# Patient Record
Sex: MALE | Race: WHITE | NOT HISPANIC OR LATINO | Employment: FULL TIME | URBAN - METROPOLITAN AREA
[De-identification: names, ages, dates, MRNs, and addresses within clinical notes are randomized per-mention and may not be internally consistent; named-entity substitution may affect disease eponyms.]

---

## 2017-09-13 ENCOUNTER — TRANSFERRED RECORDS (OUTPATIENT)
Dept: HEALTH INFORMATION MANAGEMENT | Facility: CLINIC | Age: 37
End: 2017-09-13

## 2017-10-26 ENCOUNTER — TRANSFERRED RECORDS (OUTPATIENT)
Dept: HEALTH INFORMATION MANAGEMENT | Facility: CLINIC | Age: 37
End: 2017-10-26

## 2017-12-07 ENCOUNTER — TRANSFERRED RECORDS (OUTPATIENT)
Dept: HEALTH INFORMATION MANAGEMENT | Facility: CLINIC | Age: 37
End: 2017-12-07

## 2018-04-13 ENCOUNTER — TRANSFERRED RECORDS (OUTPATIENT)
Dept: HEALTH INFORMATION MANAGEMENT | Facility: CLINIC | Age: 38
End: 2018-04-13

## 2018-07-31 ENCOUNTER — TRANSFERRED RECORDS (OUTPATIENT)
Dept: HEALTH INFORMATION MANAGEMENT | Facility: CLINIC | Age: 38
End: 2018-07-31

## 2019-04-08 ENCOUNTER — TRANSFERRED RECORDS (OUTPATIENT)
Dept: HEALTH INFORMATION MANAGEMENT | Facility: CLINIC | Age: 39
End: 2019-04-08

## 2020-04-06 ENCOUNTER — TRANSFERRED RECORDS (OUTPATIENT)
Dept: HEALTH INFORMATION MANAGEMENT | Facility: CLINIC | Age: 40
End: 2020-04-06

## 2020-07-30 ENCOUNTER — TRANSFERRED RECORDS (OUTPATIENT)
Dept: HEALTH INFORMATION MANAGEMENT | Facility: CLINIC | Age: 40
End: 2020-07-30

## 2020-08-27 ENCOUNTER — TRANSFERRED RECORDS (OUTPATIENT)
Dept: HEALTH INFORMATION MANAGEMENT | Facility: CLINIC | Age: 40
End: 2020-08-27

## 2022-03-23 ENCOUNTER — TELEPHONE (OUTPATIENT)
Dept: NEUROLOGY | Facility: CLINIC | Age: 42
End: 2022-03-23

## 2022-03-23 NOTE — TELEPHONE ENCOUNTER
[facility] Call Center    Phone Message    May a detailed message be left on voicemail: yes     Reason for Call: Other: Pt called stating that he is scheduled for an appointment on 08/09/22 with Dr. Osborne but he is not wanting to wait that long to be seen. He stated that he can see either Dr. Osborne or Dr. Perez but would like a call about any earlier appt. Writer did advise that Pt was already added to waitlist.      Action Taken: Message routed to:  [facility] ([facility]    Travel Screening: Not Applicable

## 2022-03-30 ENCOUNTER — MEDICAL CORRESPONDENCE (OUTPATIENT)
Dept: HEALTH INFORMATION MANAGEMENT | Facility: CLINIC | Age: 42
End: 2022-03-30
Payer: COMMERCIAL

## 2022-05-12 NOTE — TELEPHONE ENCOUNTER
Action 5/12/22 MV 10.47am   Action Taken Imaging request faxed to  + MASON Foley  [facility] fax #: [phone]    --5/21/22 MV 10.40am--   images resolved in PACS ; reports received from  but still need images    6/8/22 MV 10.54am  2nd request faxed to [facility]    6/10/22 MV 12.40pm  Images received from [facility] and sent to N for processing           RECORDS RECEIVED FROM: Self   REASON FOR VISIT: Radiographic Isolated Syndrome   Date of Appt: 8/9/22   NOTES (FOR ALL VISITS) STATUS DETAILS   OFFICE NOTE from other specialist Care Everywhere Dr Bala Park @ [facility]:  3/2/22    Dr Concetta Blanco @  Neurology:  7/6/21    Dr Mario Weiner @ [facility]:  11/28/17  11/2/17  10/11/17  9/5/17   MEDICATION LIST Care Everywhere    IMAGING  (FOR ALL VISITS)     LUMBAR PUNCTURE Care Everywhere [facility]:  11/20/17   MRI (HEAD, NECK, SPINE) Received [facility]:  MRI Thoracic Spine 11/24/21  MRI Cervical Spine 11/24/21  MRI Brain 11/24/21  MRI Cervical Spine 5/17/21  MRI Brain 5/17/21    [facility] [place]:  MRI Cervical Spine 4/8/19  MRI Brain 4/8/19  MRI Cervical Spine 7/31/18  MRI Brain 4/13/18  MRV Brain 12/7/17  MRI Brain 10/26/17  MRI Brain 9/13/17

## 2022-07-20 NOTE — TELEPHONE ENCOUNTER
Patient called into the call center as well as he sent a [facility] message. I have reach out both way and now will continue to communicate through [facility]    Will close this encounter    Dipti Arguello MA

## 2022-07-20 NOTE — TELEPHONE ENCOUNTER
[facility] Call Center    Phone Message    May a detailed message be left on voicemail: yes     Reason for Call: Other: Patient has an appt on 8/9/22 with Dr. Osborne and he states [place] is going [place] during that time. Writer tried to reschedule appt for sooner appt and appt not available. Patient would like be advised on what next steps to take for his care.      Please contact patient to advise at [phone].    Action Taken: Message routed to:  [facility] Neurology    Travel Screening: Not Applicable

## 2022-07-24 ENCOUNTER — HEALTH MAINTENANCE LETTER (OUTPATIENT)
Age: 42
End: 2022-07-24

## 2022-08-09 ENCOUNTER — PRE VISIT (OUTPATIENT)
Dept: NEUROLOGY | Facility: CLINIC | Age: 42
End: 2022-08-09
Payer: COMMERCIAL

## 2022-10-03 ENCOUNTER — HEALTH MAINTENANCE LETTER (OUTPATIENT)
Age: 42
End: 2022-10-03

## 2022-10-18 ENCOUNTER — OFFICE VISIT (OUTPATIENT)
Dept: NEUROLOGY | Facility: CLINIC | Age: 42
End: 2022-10-18
Attending: PSYCHIATRY & NEUROLOGY
Payer: COMMERCIAL

## 2022-10-18 VITALS
OXYGEN SATURATION: 94 % | HEART RATE: 81 BPM | SYSTOLIC BLOOD PRESSURE: 115 MMHG | WEIGHT: 169.4 LBS | DIASTOLIC BLOOD PRESSURE: 78 MMHG

## 2022-10-18 DIAGNOSIS — G35 MS (MULTIPLE SCLEROSIS) (H): Primary | ICD-10-CM

## 2022-10-18 PROCEDURE — G0463 HOSPITAL OUTPT CLINIC VISIT: HCPCS

## 2022-10-18 PROCEDURE — 99204 OFFICE O/P NEW MOD 45 MIN: CPT | Mod: GC | Performed by: PSYCHIATRY & NEUROLOGY

## 2022-10-18 RX ORDER — ERGOCALCIFEROL 1.25 MG/1
50000 CAPSULE, LIQUID FILLED ORAL WEEKLY
COMMUNITY

## 2022-10-18 ASSESSMENT — PAIN SCALES - GENERAL: PAINLEVEL: MILD PAIN (2)

## 2022-10-18 NOTE — PROGRESS NOTES
Neurology Clinic Visit    Reason: Evaluation for possible Multiple Sclerosis       10/18/2022   Source of information: Patient and chart review    History of Present Symptom:  Ramakrishna Mi is a 42 year old male with a PMH significant for right retinal detachment, traumatic cataract of the right eye, dystonic tremor who presents for concern for multiple sclerosis.    In early 2014 he developed a head tremor which led to MRI brain and cervical spine. This revealed 1-2 corpus callosum T2 lesions although it was read as normal. In 2017 he was seen for tremor again and MRI was repeated. This demonstrated T2 lesions again and this time he was recommended to undergo LP and repeat MRI. CSF studies were with positive OCB. Remained off of therapy because he lacked clinical symptoms consistent with MS.     We discussed 5-6 years ago right hand and right leg become weak. First started in the hand which slowly spread to the foot and increased over time. In the past few months he has started feeling this is worsening, limiting his ability to go about his normal life. He is having mild difficulty driving. He has started fatiguing more during exercise and will have to stop and rest due to the weakness. He feels he could walk 2-3 miles at lest before the right leg would start to feel week. He does also get tightness and pain in the foot on the right side. Additionally he notes that he has had more prominent urinary frequency over the past few months. He feels his energy level is good and he has not noticed balance difficulty.      He was last seen by [facility] 3/2/22 where he was diagnosed with radiologically isolated syndrome. His imaging remained stable from 6/2014 through 11/2021. He did discuss DMT with consideration that his weakness could be related to an old left midbrain lesion. He opted to continue observation and hold off on DMT at that time.     He has a maternal first cousin with MS.    A No-No tremor developed  about 7-8 years ago, this has improved resolved slowly over time. He did get botox for this in the past. Stress worsened tremor. He has a family history of similar tremor in his father and paternal uncle. He has also tried beta blocker, primidone, and gabapentin in the past.     The patient's medical, surgical, social, and family history were personally reviewed with the patient.    Soc hx:  Denies alcohol or substance use. Denies tobacco use.   Works as an  at the [employer].     No past medical history on file.     No past surgical history on file.   -right cataract surgery many years ago  -right vitrectomy 2021     No family history on file.  Current Outpatient Medications   Medication     vitamin D2 (ERGOCALCIFEROL) 69229 units (1250 mcg) capsule     No current facility-administered medications for this visit.     No Known Allergies      Review of Systems:  14-point review of systems was completed. The pertinent positives and negatives are in the HPI.    Physical Examination   Vitals: /78 (BP Location: Right arm, Patient Position: Sitting, Cuff Size: Adult Regular)   Pulse 81   Wt 76.8 kg (169 lb 6.4 oz)   SpO2 94%   General: Patient appears comfortable in no acute distress.   HEENT: NC/AT, no icterus, moist mucous membranes  Chest: non-labored on RA  Extremities: Warm, no edema  Skin: No rash or lesion   Psych: Affect appropriate for situation   Neuro:  Mental status: Awake, alert, attentive. Language is fluent with intact comprehension of commands.  Cranial nerves:Right pupil is 4 mm and reactive, it is superiorly placed within the iris, conjugate gaze, EOMI, visual fields intact, face symmetric, shoulder shrug strong, tongue protrusion/uvula midline, no dysarthria.   Motor: Mildly increased tone right ankle. Right to left rotational head tremor.     R L  Deltoid  5 5  Biceps  5 5  Triceps 5 5  Wrist ext 5 5  Finger ext 5 5  Finger abd 5 5    Hip flexion 5- 5  Knee flexion 5 5  Knee  ext 5 5  Dorsiflexion 5 5    Reflexes: 2+ reflexes symmetric in biceps, brachioradialis, brisk r>l patellae with crossed adduction bl, and achilles. A few beats clonus on the right, toes down-going.  Sensory: Intact to light touch, mildly reduced vibration right toe. Romberg is negative.   Coordination: FNF without ataxia or dysmetria. Coordination to finger tapping and toe tapping is symmetric.    Gait: Normal width, stride length, turn, with symmetric arm swing. Tandem walk intact.    Laboratory:  Vit D 81     CSF (2017) 4 OCB, elevated IgG  3 wbc, normal glucose and protein     Imaging:    MRI brain 11/2021  MRI brain multiple ovoid demyelinating lesions in the brain and radially oriented relative to the ventricles. Small midbrain lesion. Stable as compared to 2017. Non-contrast.     MRI c-spine   One small left dorsal cord lesion at C4-C5. Non-contrast.    MRI t-spine normal. Non-contrast.     Assessment/Plan:  Ramakrishna Mi is a 42 year old male who presents for evaluation for multiple sclerosis. We reviewed his MRI brain and spine and the imaging fits with a diagnosis of multiple sclerosis. His progressive right sided weakness which came on insidiously and worsens with exercise sounds typical of a progressive MS course. He does have a very small left sided midbrain lesion which could potentially cause his symptoms. We discussed the diagnosis of progressive MS and that he meets criteria for this. We discussed that considering his symptoms came on over the course of many years and his imaging has remained fairly stable from 2014 through 2021 aside from one left sided spinal cord lesion likely supports a slower or less aggressive disease course.     We discussed that treatments for progressive MS are limited. We discussed the FDA approved option would be Ocrevus. Discussed risks and benefits including risk of infusion reactions and viral infections. We discussed also options that work similarly including  Kesimpta and rituximab. He asks also about stem cell transplant. We discussed that there is significant risk of health complications with this and there has been shown to be poor results in progressive MS.     We discussed that it would be very reasonable to wait and watch how his imaging and exam change over time considering how minimal his day to day symptoms are at this time and how little his MRI has changed. His exam today is with minimal right thigh flexion weakness, mild sensory loss in the right toe, and brisk reflexes. He would prefer this for now. Would have a low threshold to start ocrevus if any new lesions or if he notices any progression of symptoms.     -continue vit D  -consider Ocrevus   -plan for follow up in 6 months, let us know if any new symptoms occur in the mean time.     Patient seen and discussed with Dr. Osborne.  I have reviewed the plan with the patient, who is in agreement.      Urszula Florentino, DO  Multiple Sclerosis Fellow      I saw and examined this patient, and have read and edited the resident's note.  I agree with the findings, assessment, and plan.  I spent 51 minutes on his care on the date of service, including chart review and face to face time.  Ramakrishna has MRI and CSF findings typical of MS, with complaints of insidious onset of fatiguable R arm and leg, but essentially normal neurologic exam except for assymetric LE reflexes.  I told him I think he does meet criteria for primary progressive MS.  Discussed ocrelizuamb as an option for that.  His symptoms are so mild that it is also reasonable to proceed with watchful waiting.  Clinical trial of masitinib or placebo may also be a possibility.  Plan as above.    Osito Osborne MD

## 2022-10-24 PROBLEM — G35 MS (MULTIPLE SCLEROSIS) (H): Status: ACTIVE | Noted: 2022-10-24

## 2023-04-04 ENCOUNTER — OFFICE VISIT (OUTPATIENT)
Dept: NEUROLOGY | Facility: CLINIC | Age: 43
End: 2023-04-04
Attending: PSYCHIATRY & NEUROLOGY
Payer: COMMERCIAL

## 2023-04-04 VITALS — SYSTOLIC BLOOD PRESSURE: 113 MMHG | DIASTOLIC BLOOD PRESSURE: 70 MMHG | OXYGEN SATURATION: 97 % | HEART RATE: 76 BPM

## 2023-04-04 DIAGNOSIS — R25.1 TREMOR: ICD-10-CM

## 2023-04-04 DIAGNOSIS — G35 MS (MULTIPLE SCLEROSIS) (H): Primary | ICD-10-CM

## 2023-04-04 PROCEDURE — G0463 HOSPITAL OUTPT CLINIC VISIT: HCPCS | Performed by: PSYCHIATRY & NEUROLOGY

## 2023-04-04 PROCEDURE — 99215 OFFICE O/P EST HI 40 MIN: CPT | Performed by: PSYCHIATRY & NEUROLOGY

## 2023-04-04 ASSESSMENT — PAIN SCALES - GENERAL: PAINLEVEL: NO PAIN (0)

## 2023-04-04 NOTE — NURSING NOTE
Chief Complaint   Patient presents with     MS     RECHECK     6 month follow up      Vitals were taken and medications were reconciled.   Kieran Griffin, EMT  10:25 AM

## 2023-04-04 NOTE — Clinical Note
4/4/2023       RE: Ramakrishna Mi  [address]  [address]  [place] [address]     Dear Colleague,    Thank you for referring your patient, Ramakrishna Mi, to the [facility]. Please see a copy of my visit note below.    No notes on file    Again, thank you for allowing me to participate in the care of your patient.      Sincerely,    Osito Osborne MD

## 2023-04-04 NOTE — PROGRESS NOTES
"Service Date: 04/04/2023    REASON FOR VISIT:  Ramakrishna Mi is a 42-year-old man who I have seen once previously for tremor and probable multiple sclerosis.  He returns for routine followup.  He arrived about 15 minutes late today.    HISTORY OF PRESENT ILLNESS:  Ramakrishna has been neurologically stable, thinks some things may be a bit improved including having less fatigue.  His history is of a head tremor which came on in 2014.  This led to him having workup including MRI, which showed a few lesions consistent with demyelinating disease, and ultimately CSF studies, which showed intrathecal IgG synthesis.  He then developed some subtle and insidious clumsiness and fatigability in the right arm and leg.  His walking remains fine and he thinks an impartial observer would not notice anything wrong with that.  He previously was treated with botulinum toxin injections for the tremor, but the tremor has improved over time and is really quite subtle.  It is a side to side, \"no, no\" tremor that is worse when he is stressed and tends to be worse when he has the head turned to the left.  He has some mild bladder urgency, but not clearly over and above normal range.  He has poor vision in his right eye due to prior retinal detachment and traumatic cataract of that eye.  He tends to do a lot of research into MS and has contacted us with many questions.  He has recently changed his diet, cut out gluten, and that is going fine.    PHYSICAL EXAMINATION:  Blood pressure 116/71, pulse 77, respiratory rate 12.  He is alert and oriented.  Affect is bright and language functions are normal.  Visual acuity is 20/100 in the right eye and 20/20 in the left eye.  The right pupil is superiorly displaced within the iris.  Eye movements are full without diplopia or nystagmus.  There is trace head tremor, barely noticeable during this visit.  Facial strength and sensation are normal.  Muscle bulk, tone, strength and dexterity are normal in " the arms and legs.  Light touch is intact in all 4 limbs.  Finger tapping, toe tapping and finger-nose-finger are normal.  Deep tendon reflexes are normal and symmetric at the elbows and knees.  Gait is narrow-based and stable with normal tandem walk and negative Romberg.    IMPRESSION:  Probable multiple sclerosis, phenotype unclear, but having taken an extremely benign course so far.    I spent 48 minutes on his care today, including chart review, face-to-face and documentation time.  He had lots of questions, as usual, including about the different MS phenotypes, diet in MS, and a recent study suggesting that cerebrospinal fluid from primary progressive MS causes MS like symptoms in mice.  I reassured him that I see no signs of ongoing MS progression and I would not recommend MS immunotherapy at this time.  We discussed MRI surveillance as well.    PLAN:  Follow up in 1 year with MRI of the brain.    Osito Osborne MD        D: 2023   T: 2023   MT: veronica    Name:     JOE REILLY  MRN:      [id number]        Account:      [id number]   :      1980           Service Date: 2023       Document: [id number]

## 2023-04-10 PROBLEM — R25.1 TREMOR: Status: ACTIVE | Noted: 2023-04-10

## 2023-08-13 ENCOUNTER — HEALTH MAINTENANCE LETTER (OUTPATIENT)
Age: 43
End: 2023-08-13

## 2024-04-02 ENCOUNTER — OFFICE VISIT (OUTPATIENT)
Dept: NEUROLOGY | Facility: CLINIC | Age: 44
End: 2024-04-02
Attending: PSYCHIATRY & NEUROLOGY
Payer: COMMERCIAL

## 2024-04-02 ENCOUNTER — ANCILLARY PROCEDURE (OUTPATIENT)
Dept: MRI IMAGING | Facility: CLINIC | Age: 44
End: 2024-04-02
Attending: PSYCHIATRY & NEUROLOGY
Payer: COMMERCIAL

## 2024-04-02 VITALS
OXYGEN SATURATION: 97 % | SYSTOLIC BLOOD PRESSURE: 111 MMHG | HEART RATE: 74 BPM | DIASTOLIC BLOOD PRESSURE: 73 MMHG | WEIGHT: 147.8 LBS

## 2024-04-02 DIAGNOSIS — G35 MS (MULTIPLE SCLEROSIS) (H): ICD-10-CM

## 2024-04-02 DIAGNOSIS — G25.0 BENIGN HEAD TREMOR: Primary | ICD-10-CM

## 2024-04-02 DIAGNOSIS — G37.9 DEMYELINATING DISEASE OF CENTRAL NERVOUS SYSTEM (H): ICD-10-CM

## 2024-04-02 PROCEDURE — 99214 OFFICE O/P EST MOD 30 MIN: CPT | Performed by: PSYCHIATRY & NEUROLOGY

## 2024-04-02 PROCEDURE — 70551 MRI BRAIN STEM W/O DYE: CPT | Performed by: RADIOLOGY

## 2024-04-02 ASSESSMENT — PAIN SCALES - GENERAL: PAINLEVEL: NO PAIN (0)

## 2024-04-02 NOTE — NURSING NOTE
Chief Complaint   Patient presents with    MS    RECHECK     MS follow up      Vitals were taken and medications were reconciled.  Kieran Griffin, EMT  10:04 AM

## 2024-04-02 NOTE — LETTER
"4/2/2024       RE: Ramakrishna Mi  [address] [place] [address]     Dear Colleague,    Thank you for referring your patient, Ramakrishna Mi, to the [facility] CLINIC [place] [facility]. Please see a copy of my visit note below.    ID:  Ramakrishna Mi is a 43-year-old man who I follow for probable multiple sclerosis.  He returns for annual follow-up and MRI review.    HPI:  Ramakrishna has been essentially stable neurologically, but he wonders if his memory is getting (subtly) worse, and his R hand slightly weaker.  He feels that his head tremor has been a bit more pronounced recently.  It is exacerbated by turning his head to the left, and seems to come on when he is at rest as opposed to when he is talking or \"doing something\".  He is learned to use this phenomenon to minimize the tremor during social interactions.  The head tremor arose about 10 years ago, and is what led to workup including brain MRI which showed lesions consistent with multiple sclerosis.  He also has a spinal cord lesion at C5 again typical of MS, and intrathecal IgG synthesis.  He never had anything similar in a clinical MS relapse.  He has reported subtle clumsiness and fatigability of the right arm and leg, although he has essentially had a normal neurologic exam.  He continues to work as an  at , and that is going fine.  His gait remains normal to an impartial observer.    No past medical history on file.   RIS/probable MS      Current Outpatient Medications:     vitamin D2 (ERGOCALCIFEROL) 60241 units (1250 mcg) capsule, Take 50,000 Units by mouth once a week, Disp: , Rfl:     Exam: He is alert and oriented.  Affect is bright and lung functions are normal.  He has mild head tremor brought on by turning the head to the left.  Cranial nerves unremarkable.  Muscle bulk and strength dexterity are normal in the arms and legs.  Finger tapping " toe tapping and finger-nose-finger are normal.  Light touch is intact in all 4 limbs.  Reflexes are normal and symmetric at the elbows and knees.  Gait is narrow-based and stable with normal tandem walk and negative Romberg.    We reviewed together his MRI of the brain done earlier today and compared it to the prior evaluation from 2021.  Again seen are a mild to moderate burden of typical white matter lesions including radially oriented periventricular lesions, juxtacortical lesions, and lesions in the cerebellar white matter.  There were no new lesions.  No contrast was given.    Impression: Radiological isolated syndrome, probable multiple sclerosis, stable radiologically but with subjective subtle changes in cognition and right motor function, on no immunotherapy.  I spent 40 minutes on his care on date of service including chart review and face-to-face time.  He has concerns that he has primary progressive MS, and that is possible given his insidious right motor symptoms, but his course has been remarkably benign and his neurologic exam remains normal except for the head tremor, which I do not think is a manifestation of multiple sclerosis (his father and a paternal uncle both have a similar head tremor).  I recommended he remain off MS immunotherapy and will follow-up with clinical and MRI surveillance.  He has had Botox for the head tremor in the past with some success, and he asked for a referral to try that again.    Plan: PMR referral for Botox for head tremor.  Follow-up in 1 year.  Repeat brain and cervical MRI in 2 years.    This note was completed in part using voice-recognition software, and some typographic errors may be present as a result.        Again, thank you for allowing me to participate in the care of your patient.      Sincerely,    Osito Osborne MD

## 2024-04-05 NOTE — PROGRESS NOTES
"ID:  Ramakrishna Mi is a 43-year-old man who I follow for probable multiple sclerosis.  He returns for annual follow-up and MRI review.    HPI:  Ramakrishna has been essentially stable neurologically, but he wonders if his memory is getting (subtly) worse, and his R hand slightly weaker.  He feels that his head tremor has been a bit more pronounced recently.  It is exacerbated by turning his head to the left, and seems to come on when he is at rest as opposed to when he is talking or \"doing something\".  He is learned to use this phenomenon to minimize the tremor during social interactions.  The head tremor arose about 10 years ago, and is what led to workup including brain MRI which showed lesions consistent with multiple sclerosis.  He also has a spinal cord lesion at C5 again typical of MS, and intrathecal IgG synthesis.  He never had anything similar in a clinical MS relapse.  He has reported subtle clumsiness and fatigability of the right arm and leg, although he has essentially had a normal neurologic exam.  He continues to work as an  at [employer], and that is going fine.  His gait remains normal to an impartial observer.    No past medical history on file.   RIS/probable MS      Current Outpatient Medications:      vitamin D2 (ERGOCALCIFEROL) 95459 units (1250 mcg) capsule, Take 50,000 Units by mouth once a week, Disp: , Rfl:     Exam: He is alert and oriented.  Affect is bright and lung functions are normal.  He has mild head tremor brought on by turning the head to the left.  Cranial nerves unremarkable.  Muscle bulk and strength dexterity are normal in the arms and legs.  Finger tapping toe tapping and finger-nose-finger are normal.  Light touch is intact in all 4 limbs.  Reflexes are normal and symmetric at the elbows and knees.  Gait is narrow-based and stable with normal tandem walk and negative Romberg.    We reviewed together his MRI of the brain done earlier today and compared it to the prior " evaluation from 2021.  Again seen are a mild to moderate burden of typical white matter lesions including radially oriented periventricular lesions, juxtacortical lesions, and lesions in the cerebellar white matter.  There were no new lesions.  No contrast was given.    Impression: Radiological isolated syndrome, probable multiple sclerosis, stable radiologically but with subjective subtle changes in cognition and right motor function, on no immunotherapy.  I spent 40 minutes on his care on date of service including chart review and face-to-face time.  He has concerns that he has primary progressive MS, and that is possible given his insidious right motor symptoms, but his course has been remarkably benign and his neurologic exam remains normal except for the head tremor, which I do not think is a manifestation of multiple sclerosis (his father and a paternal uncle both have a similar head tremor).  I recommended he remain off MS immunotherapy and will follow-up with clinical and MRI surveillance.  He has had Botox for the head tremor in the past with some success, and he asked for a referral to try that again.    Plan: PMR referral for Botox for head tremor.  Follow-up in 1 year.  Repeat brain and cervical MRI in 2 years.    This note was completed in part using voice-recognition software, and some typographic errors may be present as a result.

## 2024-05-20 ENCOUNTER — TELEPHONE (OUTPATIENT)
Dept: PHYSICAL MEDICINE AND REHAB | Facility: CLINIC | Age: 44
End: 2024-05-20
Payer: COMMERCIAL

## 2024-05-20 NOTE — TELEPHONE ENCOUNTER
Left appointment reminder for patient. Instructed to call [phone] if this appointment needs to be changed or rescheduled BH

## 2024-05-21 ENCOUNTER — OFFICE VISIT (OUTPATIENT)
Dept: PHYSICAL MEDICINE AND REHAB | Facility: CLINIC | Age: 44
End: 2024-05-21
Payer: COMMERCIAL

## 2024-05-21 VITALS — OXYGEN SATURATION: 98 % | SYSTOLIC BLOOD PRESSURE: 116 MMHG | DIASTOLIC BLOOD PRESSURE: 76 MMHG | HEART RATE: 71 BPM

## 2024-05-21 DIAGNOSIS — G35 MS (MULTIPLE SCLEROSIS) (H): Primary | ICD-10-CM

## 2024-05-21 DIAGNOSIS — G25.0 BENIGN HEAD TREMOR: ICD-10-CM

## 2024-05-21 PROCEDURE — 99204 OFFICE O/P NEW MOD 45 MIN: CPT | Performed by: PHYSICAL MEDICINE & REHABILITATION

## 2024-05-21 RX ORDER — VENLAFAXINE HYDROCHLORIDE 75 MG/1
1 CAPSULE, EXTENDED RELEASE ORAL DAILY
COMMUNITY
Start: 2024-01-03

## 2024-05-21 NOTE — PROGRESS NOTES
CC: I am seeing this patient at the request of Dr. Osborne for treatment of head tremors with Botox.    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  He feels that when he is anxious his head tremor gets worse.  It has been going on since 2014.  He had workup which revealed few lesions and CSF study showed intrathecal IgG synthesis.  He would experience slight weakness in the right side of the body.  His tremor was treated with neurotoxin.  Last time he received it was in 2020.  He feels it has come back and it is a little worse now.  It is a side-to-side tremor and bothers him when he is not paying attention.  He denies any issues with sensory disturbance.  He does have a history of cataract in his right eye and subsequently had a retinal detachment.  He has had treatment done for that.    He currently works for [employer].  He is reporting no issues with swallowing.  He denies any issues with his bowel and bladder function.  He is unsure if some of this is due to his natural aging.  He finds the tremor to be a huge inconvenience and tries to minimize it without success.  He also gets neck pain with changing positions.  He finds the neck to be tight and tense at times.    Past medical history is notable for childhood trauma in the right eye.    Family history is notable for tremor in his dad and uncle.  One of his knees has MS.    Social history is notable for being  with children.    Review of systems is notable for problems notable otherwise negative.    Current Outpatient Medications   Medication Sig Dispense Refill    venlafaxine (EFFEXOR XR) 75 MG 24 hr capsule Take 1 capsule by mouth daily      vitamin D2 (ERGOCALCIFEROL) 46904 units (1250 mcg) capsule Take 50,000 Units by mouth once a week        /76   Pulse 71   SpO2 98%     On examination, patient is alert and cooperative.  Vitals are stable.  He is afebrile.    HEENT is  unremarkable.  Extraocular movements are intact.  Face is symmetric.  Head tremor with intubation is present.  With eyes closed it becomes more prominent.  It appears to involve muscles in the neck and shoulder bilaterally.  He is also mildly tender over the cervical facet region.  Muscles are also slightly taut and tense and there is some tenderness in them.    Neurological examination reveals good strength on both sides though subjectively he feels weaker on the right side.  Sensory examination was unremarkable.  Reflexes are symmetric and plantars were downgoing.  Straight leg raising test was negative.  Dejon was negative.    Romberg was negative.  Gait was unremarkable.  He was able to walk on his heels and toes and with a heel-to-toe progression.  Coordination tests were intact.    Impression: At this point this 43-year-old male with probable MS, head tremors, with some headache/neck and shoulder pain likely from tense muscles in the neck and shoulder region.  He has had Botox previously up to 185 units.  I believe he will benefit from repeat injections with about 200 units.  Will do this with EMG guidance based on activation.  Anticipate that it may take 1-3 rounds to optimize the dose and the sites.  We talked about side effects including the risk of dysphagia, neck weakness etc.  We also talked about his neck pain could potentially be coming from his cervical facets and may need separate treatment on their own.    He was appreciative of this information and would like us to proceed with neurotoxin approval and schedule for return visit for injections.    45 minutes visit, greater than 50% was for counseling on above-mentioned issues.    Jair lE MD

## 2024-05-21 NOTE — LETTER
5/21/2024       RE: Ramakrishna Mi  [address] [place] [address]       Dear Colleague,    Thank you for referring your patient, Ramakrishna Mi, to the [facility] at [facility]. Please see a copy of my visit note below.    CC: I am seeing this patient at the request of Dr. Osborne for treatment of head tremors with Botox.    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  He feels that when he is anxious his head tremor gets worse.  It has been going on since 2014.  He had workup which revealed few lesions and CSF study showed intrathecal IgG synthesis.  He would experience slight weakness in the right side of the body.  His tremor was treated with neurotoxin.  Last time he received it was in 2020.  He feels it has come back and it is a little worse now.  It is a side-to-side tremor and bothers him when he is not paying attention.  He denies any issues with sensory disturbance.  He does have a history of cataract in his right eye and subsequently had a retinal detachment.  He has had treatment done for that.    He currently works for [employer].  He is reporting no issues with swallowing.  He denies any issues with his bowel and bladder function.  He is unsure if some of this is due to his natural aging.  He finds the tremor to be a huge inconvenience and tries to minimize it without success.  He also gets neck pain with changing positions.  He finds the neck to be tight and tense at times.    Past medical history is notable for childhood trauma in the right eye.    Family history is notable for tremor in his dad and uncle.  One of his knees has MS.    Social history is notable for being  with children.    Review of systems is notable for problems notable otherwise negative.    Current Outpatient Medications   Medication Sig Dispense Refill    venlafaxine (EFFEXOR XR)  75 MG 24 hr capsule Take 1 capsule by mouth daily      vitamin D2 (ERGOCALCIFEROL) 11707 units (1250 mcg) capsule Take 50,000 Units by mouth once a week        /76   Pulse 71   SpO2 98%     On examination, patient is alert and cooperative.  Vitals are stable.  He is afebrile.    HEENT is unremarkable.  Extraocular movements are intact.  Face is symmetric.  Head tremor with intubation is present.  With eyes closed it becomes more prominent.  It appears to involve muscles in the neck and shoulder bilaterally.  He is also mildly tender over the cervical facet region.  Muscles are also slightly taut and tense and there is some tenderness in them.    Neurological examination reveals good strength on both sides though subjectively he feels weaker on the right side.  Sensory examination was unremarkable.  Reflexes are symmetric and plantars were downgoing.  Straight leg raising test was negative.  Dejon was negative.    Romberg was negative.  Gait was unremarkable.  He was able to walk on his heels and toes and with a heel-to-toe progression.  Coordination tests were intact.    Impression: At this point this 43-year-old male with probable MS, head tremors, with some headache/neck and shoulder pain likely from tense muscles in the neck and shoulder region.  He has had Botox previously up to 185 units.  I believe he will benefit from repeat injections with about 200 units.  Will do this with EMG guidance based on activation.  Anticipate that it may take 1-3 rounds to optimize the dose and the sites.  We talked about side effects including the risk of dysphagia, neck weakness etc.  We also talked about his neck pain could potentially be coming from his cervical facets and may need separate treatment on their own.    He was appreciative of this information and would like us to proceed with neurotoxin approval and schedule for return visit for injections.    45 minutes visit, greater than 50% was for counseling on  above-mentioned issues.          Again, thank you for allowing me to participate in the care of your patient.      Sincerely,    Jair El MD

## 2024-05-21 NOTE — NURSING NOTE
Ramakrishna Mi is a 43 year old male who presents for:  Chief Complaint   Patient presents with    Consult     Head tremor for a long time and using botox to treat but stopped upon moving here from [place] about 3.5 years ago. Would like to restart botox        Initial Vitals:  /76   Pulse 71   SpO2 98%  There is no height or weight on file to calculate BMI.. There is no height or weight on file to calculate BSA. BP completed using cuff size: regular  Data Unavailable    Nursing Comments:     Eddie Fabian

## 2024-06-21 ENCOUNTER — OFFICE VISIT (OUTPATIENT)
Dept: PHYSICAL MEDICINE AND REHAB | Facility: CLINIC | Age: 44
End: 2024-06-21
Payer: COMMERCIAL

## 2024-06-21 VITALS
HEART RATE: 67 BPM | WEIGHT: 147 LBS | DIASTOLIC BLOOD PRESSURE: 73 MMHG | OXYGEN SATURATION: 99 % | BODY MASS INDEX: 21.77 KG/M2 | SYSTOLIC BLOOD PRESSURE: 112 MMHG | HEIGHT: 69 IN

## 2024-06-21 DIAGNOSIS — G35 MS (MULTIPLE SCLEROSIS) (H): ICD-10-CM

## 2024-06-21 DIAGNOSIS — G25.0 BENIGN HEAD TREMOR: Primary | ICD-10-CM

## 2024-06-21 DIAGNOSIS — G24.3 CERVICAL DYSTONIA: ICD-10-CM

## 2024-06-21 PROCEDURE — 64616 CHEMODENERV MUSC NECK DYSTON: CPT | Mod: XS | Performed by: PHYSICAL MEDICINE & REHABILITATION

## 2024-06-21 PROCEDURE — 99213 OFFICE O/P EST LOW 20 MIN: CPT | Mod: 25 | Performed by: PHYSICAL MEDICINE & REHABILITATION

## 2024-06-21 PROCEDURE — 95874 GUIDE NERV DESTR NEEDLE EMG: CPT | Performed by: PHYSICAL MEDICINE & REHABILITATION

## 2024-06-21 PROCEDURE — 64616 CHEMODENERV MUSC NECK DYSTON: CPT | Mod: RT | Performed by: PHYSICAL MEDICINE & REHABILITATION

## 2024-06-21 ASSESSMENT — PAIN SCALES - GENERAL: PAINLEVEL: NO PAIN (0)

## 2024-06-21 NOTE — NURSING NOTE
"Ramakrishna Mi is a 43 year old male who presents for:  Chief Complaint   Patient presents with    Procedure     BOTOX 200u       Initial Vitals: /73   Pulse 67   Ht 1.75 m (5' 8.9\")   Wt 66.7 kg (147 lb)   SpO2 99%   BMI 21.77 kg/m    BP cuff size: regular    No Pain (0)    Roge Hernandez, Visit Facilitator    "

## 2024-06-21 NOTE — PROGRESS NOTES
Patient is here for treatment of head tremors with Botox.     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  He feels that when he is anxious his head tremor gets worse.  It has been going on since 2014.  He had workup which revealed few lesions and CSF study showed intrathecal IgG synthesis.  He would experience slight weakness in the right side of the body.  His tremor was treated with neurotoxin.  Last time he received it was in 2020.  He received 185 units at that time.  He feels it has come back and it is a little worse now.  It is a side-to-side tremor and bothers him when he is not paying attention.  He denies any issues with sensory disturbance.  He does have a history of cataract in his right eye and subsequently had a retinal detachment.  He has had treatment done for that.     He currently works for [employer].  He is reporting no issues with swallowing.  He denies any issues with his bowel and bladder function.  He is unsure if some of this is due to his natural aging.  He finds the tremor to be a huge inconvenience and tries to minimize it without success.  He also gets neck pain with changing positions.  He finds the neck to be tight and tense at times.     Past medical history is notable for childhood trauma in the right eye.     Family history is notable for tremor in his dad and uncle.  One of his knees has MS.     Social history is notable for being  with children.     Review of systems is notable for problems notable otherwise negative.     Current Outpatient Prescriptions     Current Outpatient Medications   Medication Sig Dispense Refill    venlafaxine (EFFEXOR XR) 75 MG 24 hr capsule Take 1 capsule by mouth daily      vitamin D2 (ERGOCALCIFEROL) 71735 units (1250 mcg) capsule Take 50,000 Units by mouth once a week            On examination, patient is alert and cooperative.  Vitals are stable.  He is afebrile.     HEENT is  unremarkable.  Extraocular movements are intact.  Face is symmetric.  Head tremor with intubation is present.  With eyes closed it becomes more prominent.  It appears to involve muscles in the neck and shoulder bilaterally.  He is also mildly tender over the cervical facet region.  Muscles are also slightly taut and tense and there is some tenderness in them.     Neurological examination reveals good strength on both sides though subjectively he feels weaker on the right side.  Sensory examination was unremarkable.  Reflexes are symmetric and plantars were downgoing.  Straight leg raising test was negative.  Dejon was negative.     Romberg was negative.  Gait was unremarkable.  He was able to walk on his heels and toes and with a heel-to-toe progression.  Coordination tests were intact.     Impression: At this point this 43-year-old male with probable MS, head tremors, with some headache/neck and shoulder pain likely from tense muscles in the neck and shoulder region.  He has had Botox previously up to 185 units.  He would benefit from Botox injections today and we would go with 200 units.  Will do this with EMG guidance based on activation.  Anticipate that it may take 1-3 rounds to optimize the dose and the sites.  We talked about side effects including the risk of dysphagia, neck weakness etc.  We also talked about his neck pain could potentially be coming from his cervical facets and may need separate treatment on their own.     PROCEDURE NOTE: With his informed consent, after explaining the benefits and risk of the procedure, using EMG for localization preservative-free normal saline for dilution 1 cc per 100 units, 200 units were injected as follows.  The trapezius muscle on either side was injected with 40 units each at 3 different sites.  Splenius capitis on either side was injected with 40 units on either side at 2 different sites.  Levator scapulae on either side was injected with 10 units each.  The  sternocleidomastoid on either side was injected with 10 units each.  200 units were utilized in all and none wasted.  He tolerated the procedure well.  I have asked him to ice the region, anticipate the onset of Botox within 1 to 3 days, peak in 2 weeks.  I encouraged him to keep track of any adverse reactions such as dysphagia, sense of weakness etc. he is encouraged to call this clinic if this were to happen.  I did tell him that it is less likely to happen since the amount of Botox that he has previously tolerated was in the similar range.  He did have significant deviation of the sternocleidomastoid muscles on either side.  It is conceivable that he might need additional doses of Botox to these muscles at a higher dose when it is repeated in 3 months time.  I will see him in follow-up in about 4 weeks time to assess his response and plan future treatments.       20 minutes visit, greater than 50% was for counseling on above-mentioned issues, exclusive of procedure time.     Jair El MD

## 2024-06-21 NOTE — LETTER
6/21/2024       RE: Ramakrishna Mi  [address] [place] [address]       Dear Colleague,    Thank you for referring your patient, Ramakrishna Mi, to the [facility] at [facility]. Please see a copy of my visit note below.    Patient is here for treatment of head tremors with Botox.     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  He feels that when he is anxious his head tremor gets worse.  It has been going on since 2014.  He had workup which revealed few lesions and CSF study showed intrathecal IgG synthesis.  He would experience slight weakness in the right side of the body.  His tremor was treated with neurotoxin.  Last time he received it was in 2020.  He received 185 units at that time.  He feels it has come back and it is a little worse now.  It is a side-to-side tremor and bothers him when he is not paying attention.  He denies any issues with sensory disturbance.  He does have a history of cataract in his right eye and subsequently had a retinal detachment.  He has had treatment done for that.     He currently works for [employer].  He is reporting no issues with swallowing.  He denies any issues with his bowel and bladder function.  He is unsure if some of this is due to his natural aging.  He finds the tremor to be a huge inconvenience and tries to minimize it without success.  He also gets neck pain with changing positions.  He finds the neck to be tight and tense at times.     Past medical history is notable for childhood trauma in the right eye.     Family history is notable for tremor in his dad and uncle.  One of his knees has MS.     Social history is notable for being  with children.     Review of systems is notable for problems notable otherwise negative.     Current Outpatient Prescriptions     Current Outpatient Medications   Medication Sig Dispense  Refill    venlafaxine (EFFEXOR XR) 75 MG 24 hr capsule Take 1 capsule by mouth daily      vitamin D2 (ERGOCALCIFEROL) 81800 units (1250 mcg) capsule Take 50,000 Units by mouth once a week            On examination, patient is alert and cooperative.  Vitals are stable.  He is afebrile.     HEENT is unremarkable.  Extraocular movements are intact.  Face is symmetric.  Head tremor with intubation is present.  With eyes closed it becomes more prominent.  It appears to involve muscles in the neck and shoulder bilaterally.  He is also mildly tender over the cervical facet region.  Muscles are also slightly taut and tense and there is some tenderness in them.     Neurological examination reveals good strength on both sides though subjectively he feels weaker on the right side.  Sensory examination was unremarkable.  Reflexes are symmetric and plantars were downgoing.  Straight leg raising test was negative.  Dejon was negative.     Romberg was negative.  Gait was unremarkable.  He was able to walk on his heels and toes and with a heel-to-toe progression.  Coordination tests were intact.     Impression: At this point this 43-year-old male with probable MS, head tremors, with some headache/neck and shoulder pain likely from tense muscles in the neck and shoulder region.  He has had Botox previously up to 185 units.  He would benefit from Botox injections today and we would go with 200 units.  Will do this with EMG guidance based on activation.  Anticipate that it may take 1-3 rounds to optimize the dose and the sites.  We talked about side effects including the risk of dysphagia, neck weakness etc.  We also talked about his neck pain could potentially be coming from his cervical facets and may need separate treatment on their own.     PROCEDURE NOTE: With his informed consent, after explaining the benefits and risk of the procedure, using EMG for localization preservative-free normal saline for dilution 1 cc per 100 units,  200 units were injected as follows.  The trapezius muscle on either side was injected with 40 units each at 3 different sites.  Splenius capitis on either side was injected with 40 units on either side at 2 different sites.  Levator scapulae on either side was injected with 10 units each.  The sternocleidomastoid on either side was injected with 10 units each.  200 units were utilized in all and none wasted.  He tolerated the procedure well.  I have asked him to ice the region, anticipate the onset of Botox within 1 to 3 days, peak in 2 weeks.  I encouraged him to keep track of any adverse reactions such as dysphagia, sense of weakness etc. he is encouraged to call this clinic if this were to happen.  I did tell him that it is less likely to happen since the amount of Botox that he has previously tolerated was in the similar range.  He did have significant deviation of the sternocleidomastoid muscles on either side.  It is conceivable that he might need additional doses of Botox to these muscles at a higher dose when it is repeated in 3 months time.  I will see him in follow-up in about 4 weeks time to assess his response and plan future treatments.       20 minutes visit, greater than 50% was for counseling on above-mentioned issues, exclusive of procedure time.         Again, thank you for allowing me to participate in the care of your patient.      Sincerely,    Jair El MD

## 2024-07-19 ENCOUNTER — OFFICE VISIT (OUTPATIENT)
Dept: PHYSICAL MEDICINE AND REHAB | Facility: CLINIC | Age: 44
End: 2024-07-19
Payer: COMMERCIAL

## 2024-07-19 VITALS — SYSTOLIC BLOOD PRESSURE: 104 MMHG | HEART RATE: 67 BPM | OXYGEN SATURATION: 99 % | DIASTOLIC BLOOD PRESSURE: 68 MMHG

## 2024-07-19 DIAGNOSIS — G25.0 BENIGN HEAD TREMOR: Primary | ICD-10-CM

## 2024-07-19 DIAGNOSIS — G24.3 CERVICAL DYSTONIA: ICD-10-CM

## 2024-07-19 DIAGNOSIS — G35 MS (MULTIPLE SCLEROSIS) (H): ICD-10-CM

## 2024-07-19 PROCEDURE — 99214 OFFICE O/P EST MOD 30 MIN: CPT | Performed by: PHYSICAL MEDICINE & REHABILITATION

## 2024-07-19 NOTE — PROGRESS NOTES
Patient is here for treatment of head tremors with Botox.     Patient is a very pleasant 43-year-old male with a diagnosis of probable multiple sclerosis and head tremor.  He feels that when he is anxious his head tremor gets worse.  It has been going on since 2014.  He had workup which revealed few lesions and CSF study showed intrathecal IgG synthesis.  He would experience slight weakness in the right side of the body.  His tremor was treated with neurotoxin.  Last time he received it was in 2020.  He received 185 units at that time.  He feels it has come back and it is a little worse now.  It is a side-to-side tremor and bothers him when he is not paying attention.  He denies any issues with sensory disturbance.  He does have a history of cataract in his right eye and subsequently had a retinal detachment.  He has had treatment done for that.     He currently works for [employer].  He is reporting no issues with swallowing.  He denies any issues with his bowel and bladder function.  He is unsure if some of this is due to his natural aging.  He finds the tremor to be a huge inconvenience and tries to minimize it without success.  He also gets neck pain with changing positions.  He finds the neck to be tight and tense at times.     Past medical history is notable for childhood trauma in the right eye.     Family history is notable for tremor in his dad and uncle.  One of his knees has MS.     Social history is notable for being  with children.     Review of systems is notable for problems notable otherwise negative.     Current Outpatient Prescriptions      Current Outpatient Prescriptions          Current Outpatient Medications   Medication Sig Dispense Refill    venlafaxine (EFFEXOR XR) 75 MG 24 hr capsule Take 1 capsule by mouth daily        vitamin D2 (ERGOCALCIFEROL) 67626 units (1250 mcg) capsule Take 50,000 Units by mouth once a week                   On examination, patient is alert and cooperative.  Vitals  "are stable.  He is afebrile.     HEENT is unremarkable.  Extraocular movements are intact.  Face is symmetric.  Head tremor with titubation is present. Nodding is sideways.  With eyes closed it becomes more prominent.  It appears to involve muscles in the neck and shoulder bilaterally.  He is also mildly tender over the cervical facet region.  Muscles are also slightly taut and tense and there is some tenderness in them.     Neurological examination reveals good strength on both sides though subjectively he feels weaker on the right side.  Sensory examination was unremarkable.  He has classic \"No, no\" tremor. Splenius Capitis is active on either side.     Romberg was negative.  Gait was unremarkable.  He was able to walk on his heels and toes and with a heel-to-toe progression.  Coordination tests were intact.     Impression: At this point this 43-year-old male with probable MS, head tremors, with some headache/neck and shoulder pain likely from tense muscles in the neck and shoulder region.  He has had Botox previously up to 185 units.  He received 200 units on 6/21/24, with EMG guidance based on activation.  He will require repeat injections 12 weeks from 6/21/24. At that time, reexamination, with a likely increase in injection dose to Splenius Capitis may be helpful. He will seek approval from  to be continued to be seen here and will return if approved.        30 minutes visit, greater than 50% was for counseling on above-mentioned issues.     Jair El MD   "

## 2024-07-19 NOTE — NURSING NOTE
Ramakrishna Mi is a 44 year old male who presents for:  Chief Complaint   Patient presents with    Follow Up     4 week Botox follow up       Initial Vitals: /68 (BP Location: Left arm, Patient Position: Supine, Cuff Size: Adult Regular)   Pulse 67   SpO2 99%   BP cuff size: regular    Data Unavailable    Laly Velasquez MA

## 2024-07-19 NOTE — LETTER
7/19/2024       RE: Ramakrishna Mi  [address] [place] [address]     Dear Colleague,    Thank you for referring your patient, Ramakrishna Mi, to the [facility] at [facility]. Please see a copy of my visit note below.    Patient is here for treatment of head tremors with Botox.     Patient is a very pleasant 43-year-old male with a diagnosis of probable multiple sclerosis and head tremor.  He feels that when he is anxious his head tremor gets worse.  It has been going on since 2014.  He had workup which revealed few lesions and CSF study showed intrathecal IgG synthesis.  He would experience slight weakness in the right side of the body.  His tremor was treated with neurotoxin.  Last time he received it was in 2020.  He received 185 units at that time.  He feels it has come back and it is a little worse now.  It is a side-to-side tremor and bothers him when he is not paying attention.  He denies any issues with sensory disturbance.  He does have a history of cataract in his right eye and subsequently had a retinal detachment.  He has had treatment done for that.     He currently works for [email or web address].  He is reporting no issues with swallowing.  He denies any issues with his bowel and bladder function.  He is unsure if some of this is due to his natural aging.  He finds the tremor to be a huge inconvenience and tries to minimize it without success.  He also gets neck pain with changing positions.  He finds the neck to be tight and tense at times.     Past medical history is notable for childhood trauma in the right eye.     Family history is notable for tremor in his dad and uncle.  One of his knees has MS.     Social history is notable for being  with children.     Review of systems is notable for problems notable otherwise negative.     Current Outpatient Prescriptions      Current Outpatient Prescriptions          Current Outpatient  "Medications   Medication Sig Dispense Refill    venlafaxine (EFFEXOR XR) 75 MG 24 hr capsule Take 1 capsule by mouth daily        vitamin D2 (ERGOCALCIFEROL) 68928 units (1250 mcg) capsule Take 50,000 Units by mouth once a week                   On examination, patient is alert and cooperative.  Vitals are stable.  He is afebrile.     HEENT is unremarkable.  Extraocular movements are intact.  Face is symmetric.  Head tremor with titubation is present. Nodding is sideways.  With eyes closed it becomes more prominent.  It appears to involve muscles in the neck and shoulder bilaterally.  He is also mildly tender over the cervical facet region.  Muscles are also slightly taut and tense and there is some tenderness in them.     Neurological examination reveals good strength on both sides though subjectively he feels weaker on the right side.  Sensory examination was unremarkable.  He has classic \"No, no\" tremor. Splenius Capitis is active on either side.     Romberg was negative.  Gait was unremarkable.  He was able to walk on his heels and toes and with a heel-to-toe progression.  Coordination tests were intact.     Impression: At this point this 43-year-old male with probable MS, head tremors, with some headache/neck and shoulder pain likely from tense muscles in the neck and shoulder region.  He has had Botox previously up to 185 units.  He received 200 units on 6/21/24, with EMG guidance based on activation.  He will require repeat injections 12 weeks from 6/21/24. At that time, reexamination, with a likely increase in injection dose to Splenius Capitis may be helpful. He will seek approval from  to be continued to be seen here and will return if approved.        30 minutes visit, greater than 50% was for counseling on above-mentioned issues.     Jair El MD     "

## 2024-10-06 ENCOUNTER — HEALTH MAINTENANCE LETTER (OUTPATIENT)
Age: 44
End: 2024-10-06